# Patient Record
Sex: FEMALE | Race: WHITE | NOT HISPANIC OR LATINO | Employment: OTHER | URBAN - METROPOLITAN AREA
[De-identification: names, ages, dates, MRNs, and addresses within clinical notes are randomized per-mention and may not be internally consistent; named-entity substitution may affect disease eponyms.]

---

## 2022-07-20 ENCOUNTER — APPOINTMENT (OUTPATIENT)
Dept: GENERAL RADIOLOGY | Facility: HOSPITAL | Age: 62
End: 2022-07-20

## 2022-07-20 LAB
ALBUMIN SERPL-MCNC: 4.5 G/DL (ref 3.5–5.2)
ALBUMIN/GLOB SERPL: 1.5 G/DL
ALP SERPL-CCNC: 84 U/L (ref 39–117)
ALT SERPL W P-5'-P-CCNC: 58 U/L (ref 1–33)
ANION GAP SERPL CALCULATED.3IONS-SCNC: 11.7 MMOL/L (ref 5–15)
AST SERPL-CCNC: 41 U/L (ref 1–32)
BASOPHILS # BLD AUTO: 0.06 10*3/MM3 (ref 0–0.2)
BASOPHILS NFR BLD AUTO: 0.5 % (ref 0–1.5)
BILIRUB SERPL-MCNC: 0.3 MG/DL (ref 0–1.2)
BUN SERPL-MCNC: 6 MG/DL (ref 8–23)
BUN/CREAT SERPL: 5.6 (ref 7–25)
CALCIUM SPEC-SCNC: 10 MG/DL (ref 8.6–10.5)
CHLORIDE SERPL-SCNC: 99 MMOL/L (ref 98–107)
CO2 SERPL-SCNC: 24.3 MMOL/L (ref 22–29)
CREAT SERPL-MCNC: 1.07 MG/DL (ref 0.57–1)
DEPRECATED RDW RBC AUTO: 43.7 FL (ref 37–54)
EGFRCR SERPLBLD CKD-EPI 2021: 58.9 ML/MIN/1.73
EOSINOPHIL # BLD AUTO: 0.23 10*3/MM3 (ref 0–0.4)
EOSINOPHIL NFR BLD AUTO: 2 % (ref 0.3–6.2)
ERYTHROCYTE [DISTWIDTH] IN BLOOD BY AUTOMATED COUNT: 13.1 % (ref 12.3–15.4)
GLOBULIN UR ELPH-MCNC: 3.1 GM/DL
GLUCOSE SERPL-MCNC: 120 MG/DL (ref 65–99)
HCT VFR BLD AUTO: 40.3 % (ref 34–46.6)
HGB BLD-MCNC: 13.9 G/DL (ref 12–15.9)
HOLD SPECIMEN: NORMAL
HOLD SPECIMEN: NORMAL
IMM GRANULOCYTES # BLD AUTO: 0.04 10*3/MM3 (ref 0–0.05)
IMM GRANULOCYTES NFR BLD AUTO: 0.4 % (ref 0–0.5)
LYMPHOCYTES # BLD AUTO: 1.14 10*3/MM3 (ref 0.7–3.1)
LYMPHOCYTES NFR BLD AUTO: 10.1 % (ref 19.6–45.3)
MCH RBC QN AUTO: 31.7 PG (ref 26.6–33)
MCHC RBC AUTO-ENTMCNC: 34.5 G/DL (ref 31.5–35.7)
MCV RBC AUTO: 91.8 FL (ref 79–97)
MONOCYTES # BLD AUTO: 1.35 10*3/MM3 (ref 0.1–0.9)
MONOCYTES NFR BLD AUTO: 12 % (ref 5–12)
NEUTROPHILS NFR BLD AUTO: 75 % (ref 42.7–76)
NEUTROPHILS NFR BLD AUTO: 8.44 10*3/MM3 (ref 1.7–7)
NRBC BLD AUTO-RTO: 0 /100 WBC (ref 0–0.2)
PLATELET # BLD AUTO: 248 10*3/MM3 (ref 140–450)
PMV BLD AUTO: 10.2 FL (ref 6–12)
POTASSIUM SERPL-SCNC: 4.2 MMOL/L (ref 3.5–5.2)
PROT SERPL-MCNC: 7.6 G/DL (ref 6–8.5)
RBC # BLD AUTO: 4.39 10*6/MM3 (ref 3.77–5.28)
SODIUM SERPL-SCNC: 135 MMOL/L (ref 136–145)
WBC NRBC COR # BLD: 11.26 10*3/MM3 (ref 3.4–10.8)
WHOLE BLOOD HOLD COAG: NORMAL
WHOLE BLOOD HOLD SPECIMEN: NORMAL

## 2022-07-20 PROCEDURE — 87040 BLOOD CULTURE FOR BACTERIA: CPT | Performed by: STUDENT IN AN ORGANIZED HEALTH CARE EDUCATION/TRAINING PROGRAM

## 2022-07-20 PROCEDURE — 85025 COMPLETE CBC W/AUTO DIFF WBC: CPT | Performed by: STUDENT IN AN ORGANIZED HEALTH CARE EDUCATION/TRAINING PROGRAM

## 2022-07-20 PROCEDURE — 80053 COMPREHEN METABOLIC PANEL: CPT | Performed by: STUDENT IN AN ORGANIZED HEALTH CARE EDUCATION/TRAINING PROGRAM

## 2022-07-20 PROCEDURE — 71046 X-RAY EXAM CHEST 2 VIEWS: CPT

## 2022-07-20 PROCEDURE — 99284 EMERGENCY DEPT VISIT MOD MDM: CPT

## 2022-07-20 PROCEDURE — 36415 COLL VENOUS BLD VENIPUNCTURE: CPT | Performed by: STUDENT IN AN ORGANIZED HEALTH CARE EDUCATION/TRAINING PROGRAM

## 2022-07-20 RX ORDER — SODIUM CHLORIDE 0.9 % (FLUSH) 0.9 %
10 SYRINGE (ML) INJECTION AS NEEDED
Status: DISCONTINUED | OUTPATIENT
Start: 2022-07-20 | End: 2022-07-21 | Stop reason: HOSPADM

## 2022-07-20 RX ORDER — ACETAMINOPHEN 500 MG
1000 TABLET ORAL ONCE
Status: COMPLETED | OUTPATIENT
Start: 2022-07-20 | End: 2022-07-20

## 2022-07-20 RX ADMIN — ACETAMINOPHEN 1000 MG: 500 TABLET ORAL at 20:49

## 2022-07-21 ENCOUNTER — HOSPITAL ENCOUNTER (EMERGENCY)
Facility: HOSPITAL | Age: 62
Discharge: HOME | End: 2022-07-21
Attending: STUDENT IN AN ORGANIZED HEALTH CARE EDUCATION/TRAINING PROGRAM | Admitting: STUDENT IN AN ORGANIZED HEALTH CARE EDUCATION/TRAINING PROGRAM

## 2022-07-21 VITALS
SYSTOLIC BLOOD PRESSURE: 147 MMHG | BODY MASS INDEX: 46.62 KG/M2 | TEMPERATURE: 99.2 F | WEIGHT: 246.91 LBS | HEART RATE: 107 BPM | DIASTOLIC BLOOD PRESSURE: 99 MMHG | RESPIRATION RATE: 18 BRPM | OXYGEN SATURATION: 95 % | HEIGHT: 61 IN

## 2022-07-21 DIAGNOSIS — Z20.822 SUSPECTED COVID-19 VIRUS INFECTION: Primary | ICD-10-CM

## 2022-07-21 LAB — SARS-COV-2 RNA PNL SPEC NAA+PROBE: DETECTED

## 2022-07-21 PROCEDURE — C9803 HOPD COVID-19 SPEC COLLECT: HCPCS | Performed by: PHYSICIAN ASSISTANT

## 2022-07-21 PROCEDURE — U0004 COV-19 TEST NON-CDC HGH THRU: HCPCS | Performed by: PHYSICIAN ASSISTANT

## 2022-07-21 NOTE — DISCHARGE INSTRUCTIONS
Your COVID test is pending, it takes 12-24 hours for this test to result. You will be called if this test is positive. You can also check for results on MyChart. Return to the ER for severe shortness of breath, inability to keep fluids down or any concerns. Alternate Tylenol and Ibuprofen per label instructions for fever and/or aches and pains. Quarantine per CDC guidelines.     Infection Prevention in the Home  If you have an infection, may have been exposed to an infection, or are taking care of someone who has an infection, it is important to know how to keep the infection from spreading. Follow your health care provider's instructions and use these guidelines to help stop the spread of infection.  How infections are spread  In order for an infection to spread, the following must be present:  A germ. This may be a virus, bacteria, fungus, or parasite.  A place for the germ to live. This may be:  On or in a person, animal, plant, or food.  In soil or water.  On surfaces, such as a door handle.  A person or animal who can develop a disease if the germ enters the body (host). The host does not have resistance to the germ.  A way for the germ to enter the host. This may occur by:  Direct contact with an infected person or animal. This can happen through shaking hands or hugging. Some germs can also travel through the air and spread to others. This can happen when an infected person coughs or sneezes on or near other people.  Indirect contact. This occurs when the germ enters the host through contact with an infected object. Examples include:  Eating or drinking food or water that has the germ (is contaminated).  Touching a contaminated surface with your hands, and then touching your face, eyes, nose, or mouth.  Supplies needed:  Soap.  Alcohol-based hand .  Standard cleaning products.  Disinfectants, such as bleach.  Reusable cleaning cloths, sponges, or paper towels.  Disposable or reusable utility  gloves.  How to prevent infection from spreading  There are several things that you can do to help prevent infection from spreading.  Take these general actions  Everyone should take the following actions to prevent the spread of infection:  Wash your hands often with soap and water for at least 20 seconds. If soap and water are not available, use alcohol-based hand .  Avoid touching your face, mouth, nose, or eyes.  Cough or sneeze into a tissue, sleeve, or elbow instead of into your hand or into the air.  If you cough or sneeze into a tissue, throw it away immediately and wash your hands.    Keep your bathroom clean  Provide soap.  Change towels and washcloths frequently.  Change toothbrushes often and store them separately in a clean, dry place.  Clean and disinfect all surfaces, including the toilet, floor, tub, shower, and sink.  Do not share personal items, such as razors, toothbrushes, deodorant, hassan, brushes, towels, and washcloths.  Maintain hygiene in the kitchen    Wash your hands before and after preparing food and before you eat.  Clean the inside of your refrigerator each week.  Keep your refrigerator set at 40°F (4°C) or less, and set your freezer at 0°F (-18°C) or less.  Keep work surfaces clean. Disinfect them regularly.  Wash your dishes in hot, soapy water. Air-dry your dishes or use a .  Do not share dishes or eating utensils.    Handle food safely  Store food carefully.  Refrigerate leftovers promptly in covered containers.  Throw out stale or spoiled food.  Thaw foods in the refrigerator or microwave, not at room temperature.  Serve foods at the proper temperature. Do not eat raw meat. Make sure it is cooked to the appropriate temperature. Cook eggs until they are firm.  Wash fruits and vegetables under running water.  Use separate cutting boards, plates, and utensils for raw foods and cooked foods.  Use a clean spoon each time you sample food while cooking.  Do laundry the  right way  Wear gloves if laundry is visibly soiled.  Do not shake soiled laundry. Doing that may send germs into the air.  Wash laundry in hot water.  If you cannot wash the laundry right away, place it in a plastic bag and wash it as soon as possible.  Be careful around animals and pets  Wash your hands before and after touching animals.  If you have a pet, ensure that your pet stays clean. Do not let people with weak immune systems touch bird droppings, fish tank water, or a litter box.  If you have a pet cage or litter box, be sure to clean it every day.  If you are sick, stay away from animals and have someone else care for them if possible.  How to clean and disinfect objects and surfaces  Precautions  Some disinfectants work for certain germs and not others. Read the 's instructions or read online resources to determine if the product you are using will work for the germ you are trying to remove.  If you choose to use bleach, use it safely. Never mix it with other cleaning products, especially those that contain ammonia. This mixture can create a dangerous gas that may be deadly.  Keep proper movement of fresh air in your home (ventilation).  Pour used mop water down the utility sink or toilet. Do not pour this water down the kitchen sink.  Objects and surfaces    If surfaces are visibly soiled, clean them first with soap and water before disinfecting.  Disinfect surfaces that are frequently touched every day. This may include:  Counters.  Tables.  Doorknobs.  Sinks and faucets.  Electronics, such as:  Phones.  Remote controls.  Keyboards.  Computers and tablets.    Cleaning supplies  Some cleaning supplies can breed germs. Take good care of them to prevent germs from spreading. To do this:  Soak toilet brushes, mops, and sponges in bleach and water for 5 minutes after each use, or according to 's instructions.  Wash reusable cleaning cloths and sanitize sponges after each use.  Throw  away disposable gloves after one use.  Replace reusable utility gloves if they are cracked or torn or if they start to peel.  Additional actions if you are sick  If you live with other people:    Avoid close contact with those around you. Stay at least 3 ft (1 m) away from others, if possible.  Use a separate bathroom, if possible.  If possible, sleep in a separate bedroom or in a separate bed to prevent infecting other household members.  Change bedroom linens each week or whenever they are soiled.  Have everyone in the household wash hands often with soap and water. If soap and water are not available, use alcohol-based hand .    In general:  Stay home except to get medical care. Call ahead before visiting your health care provider.  Ask others to get groceries and household supplies and to refill prescriptions for you.  Avoid public areas. Try not to take public transportation.  If you can, wear a mask if you need to go out of the house, or if you are in close contact with someone who is not sick.  Avoid visitors until you have completely recovered, or until you have no signs and symptoms of infection.  Avoid preparing food or providing care for others. If you must prepare food or provide care for others, wear a mask and wash your hands before and after doing these things.  Where to find more information  Centers for Disease Control and Prevention: www.cdc.gov/nonpharmaceutical-interventions/index.html  World Health Organization (WHO): www.who.int/infection-prevention/about/en/  Association for Professionals in Infection Control and Epidemiology: professionals.site.apic.org/settings-of-care/non-healthcare-setting/home/  Summary  It is important to know how to keep the infection from spreading.  Make sure everyone in your household washes their hands often with soap and water.  Disinfect surfaces that are frequently touched every day.  If you are sick, stay home except to get medical care.  This  information is not intended to replace advice given to you by your health care provider. Make sure you discuss any questions you have with your health care provider.  Document Revised: 02/22/2021 Document Reviewed: 03/13/2020  [employer]vier Patient Education © 2021 [employer]

## 2022-07-21 NOTE — ED PROVIDER NOTES
Subjective   The patient is a 63 yo F, past medical history of asthma, who presents to the ED with a chief complaint of COVID-like symptoms. Symptoms started 1 day ago.  and son at home with same symptoms. Took an at home Covid test and was positive. Has received covid vaccinations. Using albuterol inhaler with mild relief.       History provided by:  Patient   used: No    Influenza  Presenting symptoms: cough, fever, headache, myalgias, rhinorrhea, shortness of breath and sore throat    Presenting symptoms: no diarrhea, no nausea and no vomiting    Severity:  Moderate  Onset quality:  Gradual  Duration:  1 day  Progression:  Worsening  Chronicity:  New  Associated symptoms: no chills, no ear pain and no congestion        Review of Systems   Constitutional: Positive for fever. Negative for chills.   HENT: Positive for rhinorrhea and sore throat. Negative for congestion and ear pain.    Eyes: Negative for pain.   Respiratory: Positive for cough and shortness of breath. Negative for chest tightness.    Cardiovascular: Negative for chest pain.   Gastrointestinal: Negative for abdominal pain, diarrhea, nausea and vomiting.   Genitourinary: Negative for flank pain and hematuria.   Musculoskeletal: Positive for myalgias. Negative for joint swelling.   Skin: Negative for pallor.   Neurological: Positive for headaches. Negative for seizures.   All other systems reviewed and are negative.      History reviewed. No pertinent past medical history.    Allergies   Allergen Reactions   • Tetracyclines & Related Anaphylaxis       History reviewed. No pertinent surgical history.    History reviewed. No pertinent family history.    Social History     Socioeconomic History   • Marital status:            Objective   Physical Exam  Vitals and nursing note reviewed.   Constitutional:       General: She is not in acute distress.     Appearance: Normal appearance. She is ill-appearing. She is not  toxic-appearing.   HENT:      Head: Normocephalic and atraumatic.      Nose: Nose normal.      Mouth/Throat:      Mouth: Mucous membranes are moist.   Eyes:      General: No scleral icterus.     Conjunctiva/sclera: Conjunctivae normal.   Cardiovascular:      Rate and Rhythm: Normal rate and regular rhythm.      Pulses: Normal pulses.      Heart sounds: Normal heart sounds.   Pulmonary:      Effort: Pulmonary effort is normal. No respiratory distress.      Breath sounds: Normal breath sounds. No wheezing, rhonchi or rales.   Chest:      Chest wall: No tenderness.   Abdominal:      General: Abdomen is flat.      Palpations: Abdomen is soft.      Tenderness: There is no abdominal tenderness.   Musculoskeletal:         General: Normal range of motion.      Cervical back: Normal range of motion and neck supple.   Skin:     General: Skin is warm and dry.      Capillary Refill: Capillary refill takes less than 2 seconds.   Neurological:      Mental Status: She is alert and oriented to person, place, and time. Mental status is at baseline.         Procedures           ED Course                                           MDM  Number of Diagnoses or Management Options  Suspected COVID-19 virus infection: new and requires workup  Diagnosis management comments: Discussed treatment options including outpatient Paxlovid once she has documented positive Covid test. Pt is from [place] visiting family but will not go home until she is feeling better. Told pt if she receives phone call from discharge LPN with positive results to discuss possibly getting prescription arranged/sent at that time as pt is interested in receiving the medication. She would qualify with her BMI and lung issues.     The patient is resting comfortably and feels better, is alert and in no distress. On re-examination the patient does not appear toxic and has no meningeal signs (including a negative Kernig and Brudzinski sign), and there's no intractable  vomiting, respiratory distress and no apparent pain. Based on the history, exam, diagnostic testing and reassessment, the patient has no signs of meningitis, significant pneumonia, pyelonephritis, sepsis or other acute serious bacterial infections, or other significant pathology to warrant further testing, continued ED treatment, admission or specialist evaluation. The patient's vital signs have been stable. The patient's condition is stable and is appropriate for discharge.  The patient´s symptoms are consistent with a viral syndrome. The patient was counseled to return to the ED for re-evaluation for worsening cough, shortness of breath, uncontrollable headache, uncontrollable fever, altered mental status, or any symptoms which cause them concern. The patient will pursue further outpatient evaluation with the primary care physician or other designated or consultant physician as indicated in the discharge instructions.         Amount and/or Complexity of Data Reviewed  Clinical lab tests: reviewed  Tests in the radiology section of CPT®: reviewed    Patient Progress  Patient progress: stable      Final diagnoses:   Suspected COVID-19 virus infection       ED Disposition  ED Disposition     ED Disposition   Discharge    Condition   Stable    Comment   --             Provider, No Known  [facility]  [place] [id number]    In 3 days  As needed         Medication List      No changes were made to your prescriptions during this visit.          Luis A Mckeon, APRN  07/21/22 [id number]

## 2022-07-25 LAB — BACTERIA SPEC AEROBE CULT: NORMAL
